# Patient Record
Sex: FEMALE | Race: WHITE
[De-identification: names, ages, dates, MRNs, and addresses within clinical notes are randomized per-mention and may not be internally consistent; named-entity substitution may affect disease eponyms.]

---
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
## 2022-02-23 NOTE — OCCUPATIONAL THERAPY INITIAL EVALUATION PEDIATRIC - DEVELOPMENTAL AGE, PEDS PROFILE
[address] [place] [address]  Phone: [phone]  Fax: [phone]          Return to Work/School    Patient: Karen Paredes  YOB: 1994   Date: 02/23/2022     To Whom It May Concern:     Karen Paredes was in contact with/seen in my office on 02/23/2022. COVID-19 is present in our communities across the state. There is limited testing for COVID at this time, so not all patients can be tested. In this situation, your employee meets the following criteria:     Karen Paredes has met the criteria for COVID-19 testing and has a NEGATIVE result. The employee can return to work once they are asymptomatic for 24 hours without the use of fever reducing medications (Tylenol, Motrin, etc).     If you have any questions or concerns, or if I can be of further assistance, please do not hesitate to contact me.     Sincerely,    Bel Garcia NP      16

## 2024-02-01 NOTE — ASU PATIENT PROFILE, PEDIATRIC - VISION (WITH CORRECTIVE LENSES IF THE PATIENT USUALLY WEARS THEM):
Normal vision: sees adequately in most situations; can see medication labels, newsprint Seble Gagnon is a 34 year old female presenting : Weight    Health Maintenance Due   Topic Date Due    Influenza Vaccine (1) 09/01/2023    COVID-19 Vaccine (3 - 2023-24 season) 09/01/2023    Depression Screening  07/18/2024       Patient is up to date, no discussion needed.    Patient would like communication of their results via:     Cell Phone:   Telephone Information:   Mobile [phone]     Okay to leave a message containing results? Yes